# Patient Record
Sex: FEMALE | Race: WHITE | Employment: FULL TIME | URBAN - METROPOLITAN AREA
[De-identification: names, ages, dates, MRNs, and addresses within clinical notes are randomized per-mention and may not be internally consistent; named-entity substitution may affect disease eponyms.]

---

## 2024-03-10 SDOH — HEALTH STABILITY: PHYSICAL HEALTH: ON AVERAGE, HOW MANY MINUTES DO YOU ENGAGE IN EXERCISE AT THIS LEVEL?: 60 MIN

## 2024-03-10 SDOH — HEALTH STABILITY: PHYSICAL HEALTH: ON AVERAGE, HOW MANY DAYS PER WEEK DO YOU ENGAGE IN MODERATE TO STRENUOUS EXERCISE (LIKE A BRISK WALK)?: 5 DAYS

## 2024-03-12 ENCOUNTER — OFFICE VISIT (OUTPATIENT)
Dept: PRIMARY CARE CLINIC | Age: 34
End: 2024-03-12
Payer: COMMERCIAL

## 2024-03-12 VITALS
RESPIRATION RATE: 14 BRPM | DIASTOLIC BLOOD PRESSURE: 84 MMHG | BODY MASS INDEX: 25.06 KG/M2 | HEART RATE: 122 BPM | OXYGEN SATURATION: 98 % | HEIGHT: 64 IN | WEIGHT: 146.8 LBS | SYSTOLIC BLOOD PRESSURE: 136 MMHG

## 2024-03-12 DIAGNOSIS — E55.9 VITAMIN D DEFICIENCY: ICD-10-CM

## 2024-03-12 DIAGNOSIS — Z76.89 ENCOUNTER TO ESTABLISH CARE: Primary | ICD-10-CM

## 2024-03-12 DIAGNOSIS — G89.29 CHRONIC BILATERAL LOW BACK PAIN WITHOUT SCIATICA: ICD-10-CM

## 2024-03-12 DIAGNOSIS — Z13.220 ENCOUNTER FOR LIPID SCREENING FOR CARDIOVASCULAR DISEASE: ICD-10-CM

## 2024-03-12 DIAGNOSIS — M54.50 CHRONIC BILATERAL LOW BACK PAIN WITHOUT SCIATICA: ICD-10-CM

## 2024-03-12 DIAGNOSIS — Z13.6 ENCOUNTER FOR LIPID SCREENING FOR CARDIOVASCULAR DISEASE: ICD-10-CM

## 2024-03-12 DIAGNOSIS — Z13.0 SCREENING FOR DEFICIENCY ANEMIA: ICD-10-CM

## 2024-03-12 DIAGNOSIS — E53.8 VITAMIN B 12 DEFICIENCY: ICD-10-CM

## 2024-03-12 DIAGNOSIS — Z13.29 SCREENING FOR THYROID DISORDER: ICD-10-CM

## 2024-03-12 PROCEDURE — 99204 OFFICE O/P NEW MOD 45 MIN: CPT | Performed by: NURSE PRACTITIONER

## 2024-03-12 SDOH — ECONOMIC STABILITY: FOOD INSECURITY: WITHIN THE PAST 12 MONTHS, YOU WORRIED THAT YOUR FOOD WOULD RUN OUT BEFORE YOU GOT MONEY TO BUY MORE.: NEVER TRUE

## 2024-03-12 SDOH — ECONOMIC STABILITY: INCOME INSECURITY: HOW HARD IS IT FOR YOU TO PAY FOR THE VERY BASICS LIKE FOOD, HOUSING, MEDICAL CARE, AND HEATING?: NOT HARD AT ALL

## 2024-03-12 SDOH — ECONOMIC STABILITY: HOUSING INSECURITY
IN THE LAST 12 MONTHS, WAS THERE A TIME WHEN YOU DID NOT HAVE A STEADY PLACE TO SLEEP OR SLEPT IN A SHELTER (INCLUDING NOW)?: NO

## 2024-03-12 SDOH — ECONOMIC STABILITY: FOOD INSECURITY: WITHIN THE PAST 12 MONTHS, THE FOOD YOU BOUGHT JUST DIDN'T LAST AND YOU DIDN'T HAVE MONEY TO GET MORE.: NEVER TRUE

## 2024-03-12 ASSESSMENT — PATIENT HEALTH QUESTIONNAIRE - PHQ9
SUM OF ALL RESPONSES TO PHQ9 QUESTIONS 1 & 2: 0
SUM OF ALL RESPONSES TO PHQ QUESTIONS 1-9: 0
2. FEELING DOWN, DEPRESSED OR HOPELESS: 0
1. LITTLE INTEREST OR PLEASURE IN DOING THINGS: 0
SUM OF ALL RESPONSES TO PHQ QUESTIONS 1-9: 0

## 2024-03-12 ASSESSMENT — ENCOUNTER SYMPTOMS
COUGH: 0
DIARRHEA: 0
NAUSEA: 0
SORE THROAT: 0
ABDOMINAL PAIN: 0
WHEEZING: 0
VOMITING: 0
EYE ITCHING: 0
BACK PAIN: 1
SINUS PAIN: 0
EYE REDNESS: 0
SINUS PRESSURE: 0
TROUBLE SWALLOWING: 0
CHEST TIGHTNESS: 0
EYE DISCHARGE: 0
SHORTNESS OF BREATH: 0

## 2024-03-12 NOTE — PROGRESS NOTES
records and labs  2-6.  Screening  Rx for lab work  7.  Chronic back pain  Plan to get x-rays  Referral to physical therapy  I did encourage proper stretching with mobility and flexibility    Patient is going to follow-up in 7 weeks per May return sooner if needed  Orders Placed This Encounter   Procedures    XR LUMBAR SPINE (MIN 4 VIEWS)     Standing Status:   Future     Standing Expiration Date:   3/12/2025     Order Specific Question:   Reason for exam:     Answer:   chronic lower back pain    TSH     Standing Status:   Future     Standing Expiration Date:   3/12/2025    T4, Free     Standing Status:   Future     Standing Expiration Date:   3/12/2025    Lipid Panel     Standing Status:   Future     Standing Expiration Date:   3/12/2025    CBC     Standing Status:   Future     Standing Expiration Date:   3/12/2025    Comprehensive Metabolic Panel     Standing Status:   Future     Standing Expiration Date:   3/12/2025    Vitamin D 25 Hydroxy     Standing Status:   Future     Standing Expiration Date:   3/12/2025    Vitamin B12 & Folate     Standing Status:   Future     Standing Expiration Date:   3/12/2025    [facility] -  [place]     Referral Priority:   Routine     Referral Type:   Eval and Treat     Referral Reason:   Specialty Services Required     Requested Specialty:   Physical Therapist     Number of Visits Requested:   1     No orders of the defined types were placed in this encounter.      Patient given educational materials - seepatient instructions.  Discussed use, benefit, and side effects of prescribed medications.All patient questions answered.  Pt voiced understanding. Reviewed health maintenance.Instructed to continue current medications, diet and exercise.  Patient agreedwith treatment plan. Follow up as directed.      Electronically signed by SHIV Hernandez CNP on 3/12/2024at 10:34 AM

## 2024-03-14 ENCOUNTER — HOSPITAL ENCOUNTER (OUTPATIENT)
Age: 34
Discharge: HOME | End: 2024-03-14
Payer: COMMERCIAL

## 2024-03-14 DIAGNOSIS — Z13.220 ENCOUNTER FOR LIPID SCREENING FOR CARDIOVASCULAR DISEASE: ICD-10-CM

## 2024-03-14 DIAGNOSIS — E55.9 VITAMIN D DEFICIENCY: ICD-10-CM

## 2024-03-14 DIAGNOSIS — Z13.6 ENCOUNTER FOR LIPID SCREENING FOR CARDIOVASCULAR DISEASE: ICD-10-CM

## 2024-03-14 DIAGNOSIS — Z13.0 SCREENING FOR DEFICIENCY ANEMIA: ICD-10-CM

## 2024-03-14 DIAGNOSIS — Z13.29 SCREENING FOR THYROID DISORDER: ICD-10-CM

## 2024-03-14 DIAGNOSIS — E53.8 VITAMIN B 12 DEFICIENCY: ICD-10-CM

## 2024-03-14 LAB
25(OH)D3 SERPL-MCNC: 38.4 NG/ML (ref 30–100)
ALBUMIN SERPL-MCNC: 4.7 G/DL (ref 3.5–5.2)
ALBUMIN/GLOB SERPL: 2 {RATIO} (ref 1–2.5)
ALP SERPL-CCNC: 50 U/L (ref 35–104)
ALT SERPL-CCNC: 29 U/L (ref 10–35)
ANION GAP SERPL CALCULATED.3IONS-SCNC: 12 MMOL/L (ref 9–16)
AST SERPL-CCNC: 27 U/L (ref 10–35)
BILIRUB SERPL-MCNC: 1.8 MG/DL (ref 0–1.2)
BUN SERPL-MCNC: 15 MG/DL (ref 6–20)
CALCIUM SERPL-MCNC: 9.5 MG/DL (ref 8.6–10.4)
CHLORIDE SERPL-SCNC: 100 MMOL/L (ref 98–107)
CHOLEST SERPL-MCNC: 200 MG/DL (ref 0–199)
CHOLESTEROL/HDL RATIO: 2
CO2 SERPL-SCNC: 25 MMOL/L (ref 20–31)
CREAT SERPL-MCNC: 0.8 MG/DL (ref 0.5–0.9)
ERYTHROCYTE [DISTWIDTH] IN BLOOD BY AUTOMATED COUNT: 11.4 % (ref 11.8–14.4)
FOLATE SERPL-MCNC: 5.7 NG/ML (ref 4.8–24.2)
GFR SERPL CREATININE-BSD FRML MDRD: >60 ML/MIN/1.73M2
GLUCOSE SERPL-MCNC: 84 MG/DL (ref 74–99)
HCT VFR BLD AUTO: 41.1 % (ref 36.3–47.1)
HDLC SERPL-MCNC: 81 MG/DL
HGB BLD-MCNC: 14.1 G/DL (ref 11.9–15.1)
LDLC SERPL CALC-MCNC: 111 MG/DL (ref 0–100)
MCH RBC QN AUTO: 31.5 PG (ref 25.2–33.5)
MCHC RBC AUTO-ENTMCNC: 34.3 G/DL (ref 28.4–34.8)
MCV RBC AUTO: 91.9 FL (ref 82.6–102.9)
NRBC BLD-RTO: 0 PER 100 WBC
PLATELET # BLD AUTO: 218 K/UL (ref 138–453)
PMV BLD AUTO: 10.9 FL (ref 8.1–13.5)
POTASSIUM SERPL-SCNC: 4.2 MMOL/L (ref 3.7–5.3)
PROT SERPL-MCNC: 7.4 G/DL (ref 6.6–8.7)
RBC # BLD AUTO: 4.47 M/UL (ref 3.95–5.11)
SODIUM SERPL-SCNC: 137 MMOL/L (ref 136–145)
T4 FREE SERPL-MCNC: 1.4 NG/DL (ref 0.93–1.7)
TRIGL SERPL-MCNC: 39 MG/DL
TSH SERPL DL<=0.05 MIU/L-ACNC: 1.56 UIU/ML (ref 0.27–4.2)
VIT B12 SERPL-MCNC: >2000 PG/ML (ref 232–1245)
VLDLC SERPL CALC-MCNC: 8 MG/DL
WBC OTHER # BLD: 5 K/UL (ref 3.5–11.3)

## 2024-03-14 PROCEDURE — 82306 VITAMIN D 25 HYDROXY: CPT

## 2024-03-14 PROCEDURE — 84439 ASSAY OF FREE THYROXINE: CPT

## 2024-03-14 PROCEDURE — 80061 LIPID PANEL: CPT

## 2024-03-14 PROCEDURE — 84443 ASSAY THYROID STIM HORMONE: CPT

## 2024-03-14 PROCEDURE — 82607 VITAMIN B-12: CPT

## 2024-03-14 PROCEDURE — 80053 COMPREHEN METABOLIC PANEL: CPT

## 2024-03-14 PROCEDURE — 36415 COLL VENOUS BLD VENIPUNCTURE: CPT

## 2024-03-14 PROCEDURE — 82746 ASSAY OF FOLIC ACID SERUM: CPT

## 2024-03-14 PROCEDURE — 85027 COMPLETE CBC AUTOMATED: CPT

## 2024-03-19 ENCOUNTER — HOSPITAL ENCOUNTER (OUTPATIENT)
Dept: PHYSICAL THERAPY | Facility: CLINIC | Age: 34
Setting detail: THERAPIES SERIES
Discharge: HOME | End: 2024-03-19
Payer: COMMERCIAL

## 2024-03-19 PROCEDURE — 97110 THERAPEUTIC EXERCISES: CPT

## 2024-03-19 PROCEDURE — 97161 PT EVAL LOW COMPLEX 20 MIN: CPT

## 2024-03-19 NOTE — CONSULTS
Sodium Phosphate  mAmin  47624   [x] Therapeutic Activity  35625 [] Vasopneumatic cold with compression  58871    [] Gait Training   77856 [] Ultrasound   78524   [x] Neuromuscular Re-education  99825 [] Electrical Stimulation Unattended  08235   [x] Manual Therapy  18850 [] Electrical Stimulation Attended  83406   [x] Instruction in HEP  [] Lumbar/Cervical Traction  97240   [] Aquatic Therapy   72203 [x] Cold/hotpack    [] Massage   67971      [] Dry Needling, 1 or 2 muscles  20560   [] Biofeedback, first 15 minutes   90912  [] Biofeedback, additional 15 minutes   90913 [] Dry Needling, 3 or more muscles  20561         Frequency:  2 x/week for 12 visits        Today’s Treatment:  Modalities:   Precautions:  Exercises:  Exercise Reps/ Time Weight/ Level Comments         LTR  10x5s     HS stretch 30s ea     Open book  10x ea     Cat cow  5x ea           Seated scap retraction  10x5s      Other:    Specific Instructions for next treatment:  - Strengthening core, scapulothoracic stabilizers, B shoulder, B hip  - Improve thoracolumbar mobility   - Stretching B hip girdle and thoracolumbar paraspinals   - Manual as needed   - CP/HP prn       Evaluation Complexity:  History (Personal factors, comorbidities) [] 0 [] 1-2 [x] 3+   Exam (limitations, restrictions) [] 1-2 [] 3 [x] 4+   Clinical presentation (progression) [x] Stable [] Evolving  [] Unstable   Decision Making [x] Low [] Moderate [] High    [x] Low Complexity [] Moderate Complexity [] High Complexity       Treatment Charges: Mins Units   [x] Evaluation       [x]  Low       []  Moderate       []  High 38 1   []  Modalities     [x]  Ther Exercise 12 1   []  Manual Therapy     []  Ther Activities     []  Aquatics     []  Vasocompression     []  Other       TOTAL TREATMENT TIME: 50 mins     Time in: 8:05 am       Time out: 9:00 am    Electronically signed by: Sarah Jolley PT        Physician Signature:________________________________Date:__________________  By

## 2024-03-26 ENCOUNTER — HOSPITAL ENCOUNTER (OUTPATIENT)
Dept: PHYSICAL THERAPY | Facility: CLINIC | Age: 34
Setting detail: THERAPIES SERIES
Discharge: HOME | End: 2024-03-26
Payer: COMMERCIAL

## 2024-03-26 PROCEDURE — 97110 THERAPEUTIC EXERCISES: CPT

## 2024-03-26 NOTE — FLOWSHEET NOTE
[] [facility]  Outpatient Rehabilitation &  Therapy  [address]  [phone]  [phone] [] [facility]  Outpatient Rehabilitation &  Therapy  [address]  P: [phone]  F: [phone] [] [facility]  Outpatient Rehabilitation &  Therapy  [address]  P: [phone]  F: [phone] [] [facility]  Outpatient Rehabilitation &  Therapy  [address]  [phone]  [phone] [] [facility]  Outpatient Rehabilitation &  Therapy  [address]   P: [phone]  F: [phone]  [] [facility]  Outpatient Rehabilitation &  Therapy  [address]  P: [phone]  F: [phone] [] [facility]  Outpatient Rehabilitation &  Therapy  [address]  P: [phone]  F: [phone] [] [facility]  Outpatient Rehabilitation &  Therapy  [address]  P: [phone]  F: [phone] [] [facility]  Outpatient Rehabilitation &  Therapy  [address]  P: [phone]  F: [phone]  [] [facility] Outpatient Rehabilitation &  Therapy  [address]  P: [phone]  F: [phone]     Physical Therapy Daily Treatment Note    Date:  3/26/2024  Patient Name:  Anabell Peace    :  1990  MRN: [id number]  Physician: Radha Giraldo APRN - CNP   Insurance: [employer]IG[employer] (BOMN, $20 copay)  Medical Diagnosis: M54.50, G89.29 (ICD-10-CM) - Chronic bilateral low back pain without sciatica   Rehab Codes: M54.59, M25.651, M25.652, R29.3, M62.81   Onset Date: 3/15/2018                      Next 's appt.: 24     Visit# / total visits:     Cancels/No Shows: 0      Subjective:    Pain:  [] Yes  [x] No  Location: low back   Pain Rating: (0-10 scale) 7 \"tightness\"/10  Pain altered Tx:  [x] No  [] Yes  Action:  Comments: Pt

## 2024-03-29 ENCOUNTER — HOSPITAL ENCOUNTER (OUTPATIENT)
Dept: PHYSICAL THERAPY | Facility: CLINIC | Age: 34
Setting detail: THERAPIES SERIES
Discharge: HOME | End: 2024-03-29
Payer: COMMERCIAL

## 2024-03-29 PROCEDURE — 97110 THERAPEUTIC EXERCISES: CPT

## 2024-03-29 NOTE — FLOWSHEET NOTE
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         Problems:    [x] ? Pain: 3-7-10/10 low back (thoracolumbar)   [x] ? ROM: scapular mobility, thoracolumbar spinal mobility, tissue extensibility of spinal column   [x] ? Strength: core stabilizers, B hip, B shoulder, scapulothoracic stabilizers   [x] ? Function: Modified Oswestry 38% impaired   [x] Other: impaired posture        STG: (to be met in 6 treatments)  Pt will reduce pain to less than or equal to /10 with ADLs  Pt will be able to achieve at least 35° B 90/90 SLR to demo improved B hamstrings flexibility to ease difficulty with daily ambulation   Pt will improve B shoulder and scapulothoracic stabilizers strength to at least 4-/5 throughout to improve tolerance with work-related activities includes long distance driving  Pt to improve B hip strength to at least 4-/5 throughout to ease difficulty with recreational walking activities   Pt will improve thoracolumbar mobility to WFL throughout with min c/o pain/tightness to demo improved tissue extensibility   Pt to report able to walk on treadmill for at least 45 mins with min to no inc of pain to demo improved core function and pelvic stability   Patient to be independent with home exercise program as demonstrated by performance with correct form without cues.     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

## 2024-04-01 ENCOUNTER — HOSPITAL ENCOUNTER (OUTPATIENT)
Dept: PHYSICAL THERAPY | Facility: CLINIC | Age: 34
Setting detail: THERAPIES SERIES
Discharge: HOME | End: 2024-04-01
Payer: COMMERCIAL

## 2024-04-01 PROCEDURE — 97110 THERAPEUTIC EXERCISES: CPT

## 2024-04-05 ENCOUNTER — HOSPITAL ENCOUNTER (OUTPATIENT)
Dept: PHYSICAL THERAPY | Facility: CLINIC | Age: 34
Setting detail: THERAPIES SERIES
Discharge: HOME | End: 2024-04-05
Payer: COMMERCIAL

## 2024-04-05 PROCEDURE — 97140 MANUAL THERAPY 1/> REGIONS: CPT

## 2024-04-05 PROCEDURE — 97110 THERAPEUTIC EXERCISES: CPT

## 2024-04-05 NOTE — FLOWSHEET NOTE
[] [facility]  Outpatient Rehabilitation &  Therapy  [address]  [phone]  [phone] [] [facility]  Outpatient Rehabilitation &  Therapy  [address]  P: [phone]  F: [phone] [x] [facility]  Outpatient Rehabilitation &  Therapy  [address]  P: [phone]  F: [phone] [] [facility]  Outpatient Rehabilitation &  Therapy  [address]  [phone]  [phone] [] [facility]  Outpatient Rehabilitation &  Therapy  [address]   P: [phone]  F: [phone]  [] [facility]  Outpatient Rehabilitation &  Therapy  [address]  P: [phone]  F: [phone] [] [facility]  Outpatient Rehabilitation &  Therapy  [address]  P: [phone]  F: [phone] [] [facility]  Outpatient Rehabilitation &  Therapy  [address]  P: [phone]  F: [phone] [] [facility]  Outpatient Rehabilitation &  Therapy  [address]  P: [phone]  F: [phone]  [] [facility] Outpatient Rehabilitation &  Therapy  [address]  P: [phone]  F: [phone]     Physical Therapy Daily Treatment Note    Date:  2024  Patient Name:  Anabell Peace    :  1990  MRN: [id number]  Physician: Radha Giraldo APRN - CNP   Insurance: CIG[employer] (BOMN, $20 copay)  Medical Diagnosis: M54.50, G89.29 (ICD-10-CM) - Chronic bilateral low back pain without sciatica   Rehab Codes: M54.59, M25.651, M25.652, R29.3, M62.81   Onset Date: 3/15/2018                      Next 's appt.: 24     Visit# / total visits:     Cancels/No Shows: 0        Subjective:    Pain:  [] Yes  [x] No  Location: low back   Pain Rating: (0-10 scale) \"tightness\" 4/10  Pain altered Tx:  [x] No  [] Yes  Action:  Comments: Pt

## 2024-04-09 ENCOUNTER — HOSPITAL ENCOUNTER (OUTPATIENT)
Dept: PHYSICAL THERAPY | Facility: CLINIC | Age: 34
Setting detail: THERAPIES SERIES
Discharge: HOME | End: 2024-04-09
Payer: COMMERCIAL

## 2024-04-09 PROCEDURE — 97110 THERAPEUTIC EXERCISES: CPT

## 2024-04-09 NOTE — FLOWSHEET NOTE
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 Education:  [] Yes  [x] No  [] Reviewed Prior HEP/Ed  Method of Education: [] Verbal  [] Demo  [] Written    Access Code: [email or web address]  URL: [email or web address]  Date: 03/19/2024  Prepared by: Sarah Jolley     Exercises  - Supine Lower Trunk Rotation  - 1 x daily - 7 x weekly - 2 sets - 10 reps - 5sec hold  - Supine Hamstring Stretch with Strap  - 1 x daily - 7 x weekly - 1 sets - 3 reps - 30sec hold  - Sidelying Thoracic Rotation with Open Book  - 1 x daily - 7 x weekly - 2 sets - 10 reps - 5sec hold  - Cat Cow  - 1 x daily - 7 x weekly - 2 sets - 10 reps - 5sec hold  - Seated Scapular Retraction  - 1 x daily - 7 x weekly - 2 sets - 10 reps - 5sec hold    Date: 03/29/2024  Prepared by: Sarah Jolley  Exercises  - Supine Bridge with Resistance Band  - 1 x daily - 7 x weekly - 2 sets - 10 reps  - Supine March  - 1 x daily - 7 x weekly - 2 sets - 10 reps  - Bent Knee Fallouts with Alternating Legs  - 1 x daily - 7 x weekly - 2 sets - 10 reps  - Clamshell with Resistance  - 1 x daily - 7 x weekly - 2 sets - 10 reps  - Sidelying Reverse Clamshell  - 1 x daily - 7 x weekly - 2 sets - 10 reps    Comprehension of Education:  [] Verbalizes understanding.  [] Demonstrates understanding.  [] Needs review.  [] Demonstrates/verbalizes HEP/Ed previously given.     Plan: [] Continue current frequency toward long and short term goals.    [] Specific Instructions for subsequent treatments:       Time In: 8:00 am              Time Out: 8:54 am       Electronically signed by:  Sarah Jolley PT

## 2024-04-12 ENCOUNTER — HOSPITAL ENCOUNTER (OUTPATIENT)
Dept: PHYSICAL THERAPY | Facility: CLINIC | Age: 34
Setting detail: THERAPIES SERIES
Discharge: HOME | End: 2024-04-12
Payer: COMMERCIAL

## 2024-04-12 PROCEDURE — 97110 THERAPEUTIC EXERCISES: CPT

## 2024-04-12 NOTE — FLOWSHEET NOTE
arrived stating low back has been feeling good and noticed more tightness in upper back.  Denied pain since starting therapy.      Today’s Treatment:  Modalities:   Precautions:  Exercises:  Exercise Reps/ Time Weight/ Level Comments    NuStep 5' Lvl 3  BLE only     Upright bike  6' Lvl 4     Elliptical  6' Lvl 3   x              Mat       LTR on ball  15x ea         DKTC on ball  20x ea       HS stretch 30sx3 ea        Open book  10x5s ea        Cat cow  10x ea         Quadruped T-ext with foam roller   15x5s ea      x   Quadruped T-rot on foam  10x ea    x   PPT  10x5s       TrA + marching  2x10 ea       TrA + alt knee fallout  2x10 ea ea  Lime      Bridge  2x10 Lime      Hooklying ball press 20x5s Small red ball      Deadbug  2x10 ea  Small red ball   x   TrA + SLR 8x2 3#            SL clam  2x10 Lime      SL reverse clam  2x10 A Towel roll between knee     SL hip abd  2x10 3#     SL side plank + clam hold  8x5s    x   Bird dog - LE only  2x10 ea   Attempted BUE/BLE but very unstable  x   Quadruped fire hydrant  2x10 ea Lime   x          Prone hip ext - KE 2x10 ea  3#     Prone I/Y/T/W/A 2x10  No wt   x                 Seated scap retraction  10x5s                Standing        PPT + shoulder ext  20x  Lime   x   TrA + shoulder row  20x  Blue   x   Paloff press  2x10 ea Lime x2  x   Paloff walkout  10x ea  Lime x2  x   I/Y/T 10-12x ea  Lime   x                 Other:    Manual: hypervolt to lumbar paraspinals R>L x 8' - not today     Specific Instructions for next treatment:  - Strengthening core, scapulothoracic stabilizers, B shoulder, B hip  - Improve thoracolumbar mobility   - Stretching B hip girdle and thoracolumbar paraspinals   - Manual as needed   - CP/HP prn       Treatment Charges: Mins Units   []  Modalities     [x]  Ther Exercise 54 4   []  Manual Therapy     []  Ther Activities     []  Neuro Re-ed     []  Vasocompression     [] Gait     []  Other     Total Billable time 54 4   Re-assessment time is

## 2024-04-22 ENCOUNTER — HOSPITAL ENCOUNTER (OUTPATIENT)
Dept: PHYSICAL THERAPY | Facility: CLINIC | Age: 34
Setting detail: THERAPIES SERIES
Discharge: HOME | End: 2024-04-22
Payer: COMMERCIAL

## 2024-04-22 PROCEDURE — 97110 THERAPEUTIC EXERCISES: CPT

## 2024-04-22 NOTE — FLOWSHEET NOTE
[] [facility]  Outpatient Rehabilitation &  Therapy  [address]  [phone]  [phone] [] [facility]  Outpatient Rehabilitation &  Therapy  [address]  P: [phone]  F: [phone] [x] [facility]  Outpatient Rehabilitation &  Therapy  [address]  P: [phone]  F: [phone] [] [facility]  Outpatient Rehabilitation &  Therapy  [address]  [phone]  [phone] [] [facility]  Outpatient Rehabilitation &  Therapy  [address]   P: [phone]  F: [phone]  [] [facility]  Outpatient Rehabilitation &  Therapy  [address]  P: [phone]  F: [phone] [] [facility]  Outpatient Rehabilitation &  Therapy  [address]  P: [phone]  F: [phone] [] [facility]  Outpatient Rehabilitation &  Therapy  [address]  P: [phone]  F: [phone] [] [facility]  Outpatient Rehabilitation &  Therapy  [address]  P: [phone]  F: [phone]  [] [facility] Outpatient Rehabilitation &  Therapy  [address]  P: [phone]  F: [phone]     Physical Therapy Daily Treatment Note    Date:  2024  Patient Name:  Anabell Peace    :  1990  MRN: [id number]  Physician: Radha Giraldo APRN - CNP   Insurance: CIG[employer] (BOMN, $20 copay)  Medical Diagnosis: M54.50, G89.29 (ICD-10-CM) - Chronic bilateral low back pain without sciatica   Rehab Codes: M54.59, M25.651, M25.652, R29.3, M62.81   Onset Date: 3/15/2018                      Next 's appt.: 24     Visit# / total visits:     Cancels/No Shows: 0      Subjective:    Pain:  [] Yes  [x] No  Location: upper back   Pain Rating: (0-10 scale) \"tightness\" /10  Pain altered Tx:  [x] No  [] Yes  Action:  Comments: Pt

## 2024-04-26 ENCOUNTER — HOSPITAL ENCOUNTER (OUTPATIENT)
Dept: PHYSICAL THERAPY | Facility: CLINIC | Age: 34
Setting detail: THERAPIES SERIES
Discharge: HOME | End: 2024-04-26
Payer: COMMERCIAL

## 2024-04-26 PROCEDURE — 97110 THERAPEUTIC EXERCISES: CPT

## 2024-04-26 NOTE — FLOWSHEET NOTE
[] [facility]  Outpatient Rehabilitation &  Therapy  [address]  [phone]  [phone] [] [facility]  Outpatient Rehabilitation &  Therapy  [address]  P: [phone]  F: [phone] [x] [facility]  Outpatient Rehabilitation &  Therapy  [address]  P: [phone]  F: [phone] [] [facility]  Outpatient Rehabilitation &  Therapy  [address]  [phone]  [phone] [] [facility]  Outpatient Rehabilitation &  Therapy  [address]   P: [phone]  F: [phone]  [] [facility]  Outpatient Rehabilitation &  Therapy  [address]  P: [phone]  F: [phone] [] [facility]  Outpatient Rehabilitation &  Therapy  [address]  P: [phone]  F: [phone] [] [facility]  Outpatient Rehabilitation &  Therapy  [address]  P: [phone]  F: [phone] [] [facility]  Outpatient Rehabilitation &  Therapy  [address]  P: [phone]  F: [phone]  [] [facility] Outpatient Rehabilitation &  Therapy  [address]  P: [phone]  F: [phone]     Physical Therapy Daily Treatment Note    Date:  2024  Patient Name:  Anabell Peace    :  1990  MRN: [id number]  Physician: Radha Giraldo APRN - CNP   Insurance: CIGNA (BOMN, $20 copay)  Medical Diagnosis: M54.50, G89.29 (ICD-10-CM) - Chronic bilateral low back pain without sciatica   Rehab Codes: M54.59, M25.651, M25.652, R29.3, M62.81   Onset Date: 3/15/2018                      Next 's appt.: 24     Visit# / total visits:     Cancels/No Shows: 0      Subjective:    Pain:  [] Yes  [x] No  Location: upper back   Pain Rating: (0-10 scale) \"tightness\" /10  Pain altered Tx:  [x] No  [] Yes  Action:  Comments: Pt

## 2024-05-01 ENCOUNTER — APPOINTMENT (OUTPATIENT)
Dept: PHYSICAL THERAPY | Facility: CLINIC | Age: 34
End: 2024-05-01
Payer: COMMERCIAL

## 2024-05-01 ENCOUNTER — OFFICE VISIT (OUTPATIENT)
Dept: PRIMARY CARE CLINIC | Age: 34
End: 2024-05-01
Payer: COMMERCIAL

## 2024-05-01 VITALS
WEIGHT: 147.4 LBS | BODY MASS INDEX: 25.7 KG/M2 | HEART RATE: 95 BPM | OXYGEN SATURATION: 98 % | RESPIRATION RATE: 14 BRPM | DIASTOLIC BLOOD PRESSURE: 86 MMHG | SYSTOLIC BLOOD PRESSURE: 122 MMHG

## 2024-05-01 DIAGNOSIS — G89.29 CHRONIC BILATERAL LOW BACK PAIN WITHOUT SCIATICA: Primary | ICD-10-CM

## 2024-05-01 DIAGNOSIS — M54.50 CHRONIC BILATERAL LOW BACK PAIN WITHOUT SCIATICA: Primary | ICD-10-CM

## 2024-05-01 PROCEDURE — 99213 OFFICE O/P EST LOW 20 MIN: CPT | Performed by: NURSE PRACTITIONER

## 2024-05-01 ASSESSMENT — ENCOUNTER SYMPTOMS
CHEST TIGHTNESS: 0
SHORTNESS OF BREATH: 0
ABDOMINAL PAIN: 0
SINUS PRESSURE: 0
COUGH: 0
EYE REDNESS: 0
DIARRHEA: 0
NAUSEA: 0
VOMITING: 0
EYE ITCHING: 0
SORE THROAT: 0
WHEEZING: 0
EYE DISCHARGE: 0
SINUS PAIN: 0
TROUBLE SWALLOWING: 0

## 2024-05-01 ASSESSMENT — PATIENT HEALTH QUESTIONNAIRE - PHQ9
SUM OF ALL RESPONSES TO PHQ9 QUESTIONS 1 & 2: 0
SUM OF ALL RESPONSES TO PHQ QUESTIONS 1-9: 0
SUM OF ALL RESPONSES TO PHQ QUESTIONS 1-9: 0
2. FEELING DOWN, DEPRESSED OR HOPELESS: NOT AT ALL
1. LITTLE INTEREST OR PLEASURE IN DOING THINGS: NOT AT ALL
SUM OF ALL RESPONSES TO PHQ QUESTIONS 1-9: 0
SUM OF ALL RESPONSES TO PHQ QUESTIONS 1-9: 0

## 2024-05-01 NOTE — PROGRESS NOTES
Out    Polio vaccine  Aged Out    Meningococcal (ACWY) vaccine  Aged Out    Pneumococcal 0-64 years Vaccine  Aged Out       :     Review of Systems   Constitutional:  Negative for chills, fatigue and fever.   HENT:  Negative for ear discharge, ear pain, sinus pressure, sinus pain, sore throat and trouble swallowing.    Eyes:  Negative for discharge, redness and itching.   Respiratory:  Negative for cough, chest tightness, shortness of breath and wheezing.    Cardiovascular:  Negative for chest pain.   Gastrointestinal:  Negative for abdominal pain, diarrhea, nausea and vomiting.   Genitourinary:  Negative for difficulty urinating.   Musculoskeletal:  Negative for arthralgias and neck pain.   Skin:  Negative for rash.   Neurological:  Negative for dizziness, weakness, light-headedness and headaches.   All other systems reviewed and are negative.      Objective:     Physical Exam  Constitutional:       General: She is not in acute distress.     Appearance: Normal appearance. She is normal weight. She is not ill-appearing.   HENT:      Head: Normocephalic and atraumatic.      Nose: Nose normal. No congestion or rhinorrhea.      Mouth/Throat:      Mouth: Mucous membranes are moist.      Pharynx: Oropharynx is clear. No oropharyngeal exudate or posterior oropharyngeal erythema.   Eyes:      Extraocular Movements: Extraocular movements intact.      Conjunctiva/sclera: Conjunctivae normal.      Pupils: Pupils are equal, round, and reactive to light.   Cardiovascular:      Rate and Rhythm: Normal rate and regular rhythm.      Pulses: Normal pulses.      Heart sounds: Normal heart sounds. No murmur heard.  Pulmonary:      Effort: Pulmonary effort is normal. No respiratory distress.      Breath sounds: Normal breath sounds. No wheezing.   Abdominal:      General: Abdomen is flat. Bowel sounds are normal. There is no distension.      Palpations: Abdomen is soft.      Tenderness: There is no abdominal tenderness. There is no

## 2024-05-06 ENCOUNTER — HOSPITAL ENCOUNTER (OUTPATIENT)
Dept: PHYSICAL THERAPY | Facility: CLINIC | Age: 34
Setting detail: THERAPIES SERIES
Discharge: HOME | End: 2024-05-06
Payer: COMMERCIAL

## 2024-05-06 PROCEDURE — 97110 THERAPEUTIC EXERCISES: CPT

## 2024-05-06 NOTE — FLOWSHEET NOTE
[] [facility]  Outpatient Rehabilitation &  Therapy  [address]  [phone]  [phone] [] [facility]  Outpatient Rehabilitation &  Therapy  [address]  P: [phone]  F: [phone] [x] [facility]  Outpatient Rehabilitation &  Therapy  [address]  P: [phone]  F: [phone] [] [facility]  Outpatient Rehabilitation &  Therapy  [address]  [phone]  [phone] [] [facility]  Outpatient Rehabilitation &  Therapy  [address]   P: [phone]  F: [phone]  [] [facility]  Outpatient Rehabilitation &  Therapy  [address]  P: [phone]  F: [phone] [] [facility]  Outpatient Rehabilitation &  Therapy  [address]  P: [phone]  F: [phone] [] [facility]  Outpatient Rehabilitation &  Therapy  [address]  P: [phone]  F: [phone] [] [facility]  Outpatient Rehabilitation &  Therapy  [address]  P: [phone]  F: [phone]  [] [facility] Outpatient Rehabilitation &  Therapy  [address]  P: [phone]  F: [phone]     Physical Therapy Daily Treatment Note    Date:  2024  Patient Name:  Anabell Peace    :  1990  MRN: [id number]  Physician: Radha Giraldo APRN - CNP   Insurance: CIGNA (BOMN, $20 copay)  Medical Diagnosis: M54.50, G89.29 (ICD-10-CM) - Chronic bilateral low back pain without sciatica   Rehab Codes: M54.59, M25.651, M25.652, R29.3, M62.81   Onset Date: 3/15/2018                      Next 's appt.: 24     Visit# / total visits: 10/12    Cancels/No Shows: 0      Subjective:    Pain:  [] Yes  [x] No  Location: upper back   Pain Rating: (0-10 scale) \"tightness\" /10  Pain altered Tx:  [x] No  [] Yes  Action:  Comments: Pt

## 2024-05-08 ENCOUNTER — APPOINTMENT (OUTPATIENT)
Dept: PHYSICAL THERAPY | Facility: CLINIC | Age: 34
End: 2024-05-08
Payer: COMMERCIAL

## 2024-05-13 ENCOUNTER — HOSPITAL ENCOUNTER (OUTPATIENT)
Dept: PHYSICAL THERAPY | Facility: CLINIC | Age: 34
Setting detail: THERAPIES SERIES
Discharge: HOME | End: 2024-05-13
Payer: COMMERCIAL

## 2024-05-13 PROCEDURE — 97110 THERAPEUTIC EXERCISES: CPT

## 2024-05-13 NOTE — FLOWSHEET NOTE
weekly - 2 sets - 10 reps - 5sec hold  - Cat Cow  - 1 x daily - 7 x weekly - 2 sets - 10 reps - 5sec hold  - Seated Scapular Retraction  - 1 x daily - 7 x weekly - 2 sets - 10 reps - 5sec hold    Date: 03/29/2024  Prepared by: Sarah Jolley  Exercises  - Supine Bridge with Resistance Band  - 1 x daily - 7 x weekly - 2 sets - 10 reps  - Supine March  - 1 x daily - 7 x weekly - 2 sets - 10 reps  - Bent Knee Fallouts with Alternating Legs  - 1 x daily - 7 x weekly - 2 sets - 10 reps  - Clamshell with Resistance  - 1 x daily - 7 x weekly - 2 sets - 10 reps  - Sidelying Reverse Clamshell  - 1 x daily - 7 x weekly - 2 sets - 10 reps    Date: 04/12/2024  Prepared by: Sarah Jolley  Exercises  - Sidelying Reverse Clamshell  - 1 x daily - 7 x weekly - 2 sets - 10 reps  - Quadruped Fire Hydrant  - 1 x daily - 7 x weekly - 2 sets - 10 reps  - Prone W Scapular Retraction  - 1 x daily - 7 x weekly - 2 sets - 10 reps  - Prone Scapular Retraction Y  - 1 x daily - 7 x weekly - 2 sets - 10 reps  - Prone Scapular Retraction  - 1 x daily - 7 x weekly - 2 sets - 10 reps  - Shoulder Extension with Resistance - Palms Forward  - 1 x daily - 7 x weekly - 2 sets - 10 reps  - Standing Shoulder Row with Anchored Resistance  - 1 x daily - 7 x weekly - 2 sets - 10 reps    Comprehension of Education:  [] Verbalizes understanding.  [] Demonstrates understanding.  [] Needs review.  [] Demonstrates/verbalizes HEP/Ed previously given.     Plan: [x] Continue current frequency toward long and short term goals.    [] Specific Instructions for subsequent treatments:       Time In: 8:02 am              Time Out: 8:55 am       Electronically signed by:  Sarah Jolley, PT

## 2024-05-24 ENCOUNTER — HOSPITAL ENCOUNTER (OUTPATIENT)
Dept: PHYSICAL THERAPY | Facility: CLINIC | Age: 34
Setting detail: THERAPIES SERIES
Discharge: HOME | End: 2024-05-24
Payer: COMMERCIAL

## 2024-05-24 PROCEDURE — 97110 THERAPEUTIC EXERCISES: CPT

## 2024-05-24 NOTE — FLOWSHEET NOTE
frequency toward long and short term goals.    [] Specific Instructions for subsequent treatments:       Time In: 8:02 am              Time Out: 8:56 am       Electronically signed by:  Sarah Jolley PT

## 2024-05-24 NOTE — DISCHARGE SUMMARY
scale) \"stiff\" /10  Pain altered Tx:  [x] No  [] Yes  Action:  Comments: Pt arrived to physical therapy with c/o some stiffness in upper back from being sick all week but denied pain.  Pt reported is feeling good overall and is ready to be done with therapy.     Objective: Re-assessed on 5/24/24 by An Samreen, PT               STRENGTH   STRENGTH     Left Right   Left Right   C5 Shld Abd 4+/5 4+/5 L1-2 Hip Flex 5/5 5/5   Shld Flexion 4+/5 4+/5 Hip Abd 5/5 5/5   Shld IR 5/5 5/5 Hip Ext 5/5 5/5   Shld ER 5/5 5/5 Knee Flex             L3-4 Knee Ext       Lower trap 4-/5 4-/5 L5 EHL       Rhomboids/midtrap 4-/5 4-/5 L4 Ankle DF       Latissimus dorsi 4/5 4/5 S1 Plant. Flex       *painful             ROM   Lumbar     Flexion WFL    Extension WFL   Rotation L WFL R WFL   Sidebend L WFL R WFL            Assessment:  [x] Progressing toward goals.  Re-assessment of LTGs and overall function to prepare for discharged.  Pt demo improvement of B hip girdle, B shoulder, and scapulothoracic stabilizers strength globally, core function, tissue extensibility of lumbar paraspinals, and postural awareness as pt demo/verbal ability to self-correct \"slouch\" posture in sitting/standing.  Stated has been able to perform walking on treadmill for 45 mins while working remotely at home without pain or stiffness in comparison to prior starting therapy.  Modified Oswestry improved to only 4% impaired secondary to report of inc muscle soreness of spinal column ms with inc standing time but \"not like how it was before\".  Issued updated HEP and purple band as well as educated pt on benefit and importance of completing HEP in a consistent manner to prevent regression and future injury with verbal understanding noted.  Pt is now discharged.                         STG: (to be met in 6 treatments) - Re-assessed on 4/9/24 by An Samreen, PT  Pt will reduce pain to less than or equal to 3/10 with ADLs - Progressing (4/10 max)  Pt will be able to achieve

## 2025-05-11 ASSESSMENT — PATIENT HEALTH QUESTIONNAIRE - PHQ9
SUM OF ALL RESPONSES TO PHQ9 QUESTIONS 1 & 2: 0
2. FEELING DOWN, DEPRESSED OR HOPELESS: NOT AT ALL
1. LITTLE INTEREST OR PLEASURE IN DOING THINGS: NOT AT ALL
SUM OF ALL RESPONSES TO PHQ QUESTIONS 1-9: 0
1. LITTLE INTEREST OR PLEASURE IN DOING THINGS: NOT AT ALL
SUM OF ALL RESPONSES TO PHQ QUESTIONS 1-9: 0
SUM OF ALL RESPONSES TO PHQ QUESTIONS 1-9: 0
2. FEELING DOWN, DEPRESSED OR HOPELESS: NOT AT ALL
SUM OF ALL RESPONSES TO PHQ QUESTIONS 1-9: 0

## 2025-05-14 ENCOUNTER — OFFICE VISIT (OUTPATIENT)
Dept: PRIMARY CARE CLINIC | Age: 35
End: 2025-05-14
Payer: COMMERCIAL

## 2025-05-14 VITALS
SYSTOLIC BLOOD PRESSURE: 120 MMHG | HEART RATE: 89 BPM | HEIGHT: 64 IN | WEIGHT: 132.2 LBS | BODY MASS INDEX: 22.57 KG/M2 | DIASTOLIC BLOOD PRESSURE: 68 MMHG | OXYGEN SATURATION: 99 % | RESPIRATION RATE: 14 BRPM

## 2025-05-14 DIAGNOSIS — K59.04 CHRONIC IDIOPATHIC CONSTIPATION: ICD-10-CM

## 2025-05-14 DIAGNOSIS — K62.5 RECTAL BLEEDING: ICD-10-CM

## 2025-05-14 DIAGNOSIS — Z00.00 ENCOUNTER FOR WELL ADULT EXAM WITHOUT ABNORMAL FINDINGS: Primary | ICD-10-CM

## 2025-05-14 PROCEDURE — 99395 PREV VISIT EST AGE 18-39: CPT | Performed by: NURSE PRACTITIONER

## 2025-05-14 RX ORDER — DOXYCYCLINE 100 MG/1
100 CAPSULE ORAL 2 TIMES DAILY
COMMUNITY
Start: 2025-04-30

## 2025-05-14 SDOH — ECONOMIC STABILITY: FOOD INSECURITY: WITHIN THE PAST 12 MONTHS, THE FOOD YOU BOUGHT JUST DIDN'T LAST AND YOU DIDN'T HAVE MONEY TO GET MORE.: NEVER TRUE

## 2025-05-14 SDOH — ECONOMIC STABILITY: FOOD INSECURITY: WITHIN THE PAST 12 MONTHS, YOU WORRIED THAT YOUR FOOD WOULD RUN OUT BEFORE YOU GOT MONEY TO BUY MORE.: NEVER TRUE

## 2025-05-14 ASSESSMENT — ENCOUNTER SYMPTOMS
SHORTNESS OF BREATH: 0
ANAL BLEEDING: 1
ABDOMINAL PAIN: 0
DIARRHEA: 0
NAUSEA: 0
VOMITING: 0
CHEST TIGHTNESS: 0
CONSTIPATION: 1
SORE THROAT: 0
EYE REDNESS: 0
TROUBLE SWALLOWING: 0
COUGH: 0
SINUS PRESSURE: 0
EYE ITCHING: 0
SINUS PAIN: 0
EYE DISCHARGE: 0
WHEEZING: 0

## 2025-05-14 NOTE — PROGRESS NOTES
Well Adult Note  Name: Anabell Peace Today’s Date: 2025   MRN: [id number] Sex: Female   Age: 34 y.o. Ethnicity: Non- / Non    : 1990 Race: White (non-)      Anabell Peace is here for a well adult exam.          Assessment & Plan  1. Health maintenance.  - Blood pressure readings are within the normal range.  - Weight loss of approximately 15 pounds since the last consultation, attributed to minor dietary modifications and incorporation of spin classes.  - Currently on doxycycline for acne management, with plans to discontinue its use soon.  - Advised to apply sunscreen when exposed to sunlight due to potential sun sensitivity associated with doxycycline use.    2. Constipation and suspected hemorrhoids.  - Presence of bright red blood on the toilet paper post-defecation could indicate hemorrhoids, potentially exacerbated by constipation.  - Advised to incorporate psyllium husk into the diet, consuming 1 to 2 tablespoons daily, and ensure adequate hydration.  - Recommended an increase in dietary fiber intake.  - If the bleeding intensifies or becomes more frequent, she is to inform us immediately.    Encounter for well adult exam without abnormal findings  -no labs this year. Reviewed previous labs   Chronic idiopathic constipation  Rectal bleeding    Results         Return in about 1 year (around 2026) for physical .     Subjective   History of Present Illness  The patient presents for physical and evaluation of constipation and hemorrhoids.    She reports overall good health with no significant complaints. Since her last visit, she has lost approximately 15 pounds, which she attributes to minor dietary modifications and the incorporation of spin classes into her exercise routine. She attends these classes 2 to 3 times per week, each lasting 45 minutes. Additionally, she engages in walking and weightlifting exercises. She is not interested in getting any lab work done